# Patient Record
Sex: FEMALE | Employment: UNEMPLOYED | URBAN - METROPOLITAN AREA
[De-identification: names, ages, dates, MRNs, and addresses within clinical notes are randomized per-mention and may not be internally consistent; named-entity substitution may affect disease eponyms.]

---

## 2017-03-03 ENCOUNTER — TELEPHONE (OUTPATIENT)
Dept: INTERNAL MEDICINE CLINIC | Age: 37
End: 2017-03-03

## 2017-03-03 NOTE — TELEPHONE ENCOUNTER
Patient called she took her children to the Dr and they were diagnosed with the Flu she is now getting the same symptoms and wants to know if Dr Tressa Huang will send Tamiflu to her Pharmacy. She said she will come in if he wants but she will have to bring her children.  [phone]

## 2017-03-06 ENCOUNTER — TELEPHONE (OUTPATIENT)
Dept: INTERNAL MEDICINE CLINIC | Age: 37
End: 2017-03-06

## 2017-03-06 NOTE — TELEPHONE ENCOUNTER
Patient needs a referral to Dermatologist.  She would like to use [facility]. She has [employer].   Please call her at [phone]

## 2017-03-06 NOTE — TELEPHONE ENCOUNTER
Patient was notified of upcoming appointment with Dr. Kassy Garrido, with [facility], in the [place] office on 3/21 at 1:30.

## 2018-11-04 ENCOUNTER — HOSPITAL ENCOUNTER (INPATIENT)
Age: 38
LOS: 2 days | Discharge: HOME | End: 2018-11-06
Attending: OBSTETRICS & GYNECOLOGY | Admitting: SPECIALIST
Payer: COMMERCIAL

## 2018-11-04 PROBLEM — O22.03 OBSTETRIC VARICOSE VEINS IN THIRD TRIMESTER: Status: ACTIVE | Noted: 2018-11-04

## 2018-11-04 LAB
ABO + RH BLD: NORMAL
BASOPHILS # BLD: 0 K/UL (ref 0–0.1)
BASOPHILS NFR BLD: 0 % (ref 0–2)
BLOOD GROUP ANTIBODIES SERPL: NORMAL
DIFFERENTIAL METHOD BLD: ABNORMAL
EOSINOPHIL # BLD: 0.1 K/UL (ref 0–0.4)
EOSINOPHIL NFR BLD: 1 % (ref 0–5)
ERYTHROCYTE [DISTWIDTH] IN BLOOD BY AUTOMATED COUNT: 13.6 % (ref 11.6–14.5)
HCT VFR BLD AUTO: 37.5 % (ref 35–45)
HGB BLD-MCNC: 13 G/DL (ref 12–16)
LYMPHOCYTES # BLD: 1.7 K/UL (ref 0.9–3.6)
LYMPHOCYTES NFR BLD: 17 % (ref 21–52)
MCH RBC QN AUTO: 31.5 PG (ref 24–34)
MCHC RBC AUTO-ENTMCNC: 34.7 G/DL (ref 31–37)
MCV RBC AUTO: 90.8 FL (ref 74–97)
MONOCYTES # BLD: 0.8 K/UL (ref 0.05–1.2)
MONOCYTES NFR BLD: 8 % (ref 3–10)
NEUTS SEG # BLD: 7.3 K/UL (ref 1.8–8)
NEUTS SEG NFR BLD: 74 % (ref 40–73)
PLATELET # BLD AUTO: 186 K/UL (ref 135–420)
PMV BLD AUTO: 10 FL (ref 9.2–11.8)
RBC # BLD AUTO: 4.13 M/UL (ref 4.2–5.3)
SPECIMEN EXP DATE BLD: NORMAL
WBC # BLD AUTO: 9.8 K/UL (ref 4.6–13.2)

## 2018-11-04 PROCEDURE — 86900 BLOOD TYPING SEROLOGIC ABO: CPT | Performed by: ADVANCED PRACTICE MIDWIFE

## 2018-11-04 PROCEDURE — 65270000029 HC RM PRIVATE

## 2018-11-04 PROCEDURE — 74011250636 HC RX REV CODE- 250/636: Performed by: ADVANCED PRACTICE MIDWIFE

## 2018-11-04 PROCEDURE — 74011250637 HC RX REV CODE- 250/637: Performed by: ADVANCED PRACTICE MIDWIFE

## 2018-11-04 PROCEDURE — 85025 COMPLETE CBC W/AUTO DIFF WBC: CPT | Performed by: ADVANCED PRACTICE MIDWIFE

## 2018-11-04 PROCEDURE — 0HQ9XZZ REPAIR PERINEUM SKIN, EXTERNAL APPROACH: ICD-10-PCS | Performed by: OBSTETRICS & GYNECOLOGY

## 2018-11-04 RX ORDER — TERBUTALINE SULFATE 1 MG/ML
0.25 INJECTION SUBCUTANEOUS
Status: DISCONTINUED | OUTPATIENT
Start: 2018-11-04 | End: 2018-11-04

## 2018-11-04 RX ORDER — AMOXICILLIN 250 MG
1 CAPSULE ORAL
Status: DISCONTINUED | OUTPATIENT
Start: 2018-11-04 | End: 2018-11-06 | Stop reason: HOSPADM

## 2018-11-04 RX ORDER — HYDROMORPHONE HYDROCHLORIDE 1 MG/ML
1 INJECTION, SOLUTION INTRAMUSCULAR; INTRAVENOUS; SUBCUTANEOUS
Status: DISCONTINUED | OUTPATIENT
Start: 2018-11-04 | End: 2018-11-04

## 2018-11-04 RX ORDER — BUTORPHANOL TARTRATE 1 MG/ML
2 INJECTION INTRAMUSCULAR; INTRAVENOUS
Status: DISCONTINUED | OUTPATIENT
Start: 2018-11-04 | End: 2018-11-04

## 2018-11-04 RX ORDER — HYDROCORTISONE 25 MG/G
CREAM TOPICAL AS NEEDED
Status: DISCONTINUED | OUTPATIENT
Start: 2018-11-04 | End: 2018-11-06 | Stop reason: HOSPADM

## 2018-11-04 RX ORDER — IBUPROFEN 400 MG/1
800 TABLET ORAL
Status: DISCONTINUED | OUTPATIENT
Start: 2018-11-04 | End: 2018-11-06 | Stop reason: HOSPADM

## 2018-11-04 RX ORDER — LIDOCAINE HYDROCHLORIDE 10 MG/ML
20 INJECTION, SOLUTION EPIDURAL; INFILTRATION; INTRACAUDAL; PERINEURAL AS NEEDED
Status: DISCONTINUED | OUTPATIENT
Start: 2018-11-04 | End: 2018-11-04

## 2018-11-04 RX ORDER — OXYTOCIN/RINGER'S LACTATE 20/1000 ML
500 PLASTIC BAG, INJECTION (ML) INTRAVENOUS ONCE
Status: COMPLETED | OUTPATIENT
Start: 2018-11-04 | End: 2018-11-04

## 2018-11-04 RX ORDER — ACETAMINOPHEN 325 MG/1
650 TABLET ORAL
Status: DISCONTINUED | OUTPATIENT
Start: 2018-11-04 | End: 2018-11-06 | Stop reason: HOSPADM

## 2018-11-04 RX ORDER — OXYTOCIN/RINGER'S LACTATE 20/1000 ML
125 PLASTIC BAG, INJECTION (ML) INTRAVENOUS CONTINUOUS
Status: DISCONTINUED | OUTPATIENT
Start: 2018-11-04 | End: 2018-11-04

## 2018-11-04 RX ORDER — NALBUPHINE HYDROCHLORIDE 10 MG/ML
10 INJECTION, SOLUTION INTRAMUSCULAR; INTRAVENOUS; SUBCUTANEOUS
Status: DISCONTINUED | OUTPATIENT
Start: 2018-11-04 | End: 2018-11-04

## 2018-11-04 RX ORDER — METHYLERGONOVINE MALEATE 0.2 MG/ML
0.2 INJECTION INTRAVENOUS AS NEEDED
Status: DISCONTINUED | OUTPATIENT
Start: 2018-11-04 | End: 2018-11-04

## 2018-11-04 RX ORDER — SALICYLIC ACID
90 POWDER (GRAM) MISCELLANEOUS ONCE
Status: COMPLETED | OUTPATIENT
Start: 2018-11-04 | End: 2018-11-04

## 2018-11-04 RX ORDER — PROMETHAZINE HYDROCHLORIDE 25 MG/ML
25 INJECTION, SOLUTION INTRAMUSCULAR; INTRAVENOUS
Status: DISCONTINUED | OUTPATIENT
Start: 2018-11-04 | End: 2018-11-06 | Stop reason: HOSPADM

## 2018-11-04 RX ORDER — ZOLPIDEM TARTRATE 5 MG/1
5 TABLET ORAL
Status: DISCONTINUED | OUTPATIENT
Start: 2018-11-04 | End: 2018-11-06 | Stop reason: HOSPADM

## 2018-11-04 RX ORDER — MISOPROSTOL 200 UG/1
800 TABLET ORAL
Status: DISCONTINUED | OUTPATIENT
Start: 2018-11-04 | End: 2018-11-04

## 2018-11-04 RX ORDER — SODIUM CHLORIDE, SODIUM LACTATE, POTASSIUM CHLORIDE, CALCIUM CHLORIDE 600; 310; 30; 20 MG/100ML; MG/100ML; MG/100ML; MG/100ML
125 INJECTION, SOLUTION INTRAVENOUS CONTINUOUS
Status: DISCONTINUED | OUTPATIENT
Start: 2018-11-04 | End: 2018-11-04

## 2018-11-04 RX ADMIN — CASTOR OIL 60 ML: 1 LIQUID ORAL at 22:20

## 2018-11-04 RX ADMIN — Medication 10000 MILLI-UNITS/HR: at 22:28

## 2018-11-05 LAB
HCT VFR BLD AUTO: 35 % (ref 35–45)
HGB BLD-MCNC: 11.6 G/DL (ref 12–16)

## 2018-11-05 PROCEDURE — 74011250637 HC RX REV CODE- 250/637: Performed by: ADVANCED PRACTICE MIDWIFE

## 2018-11-05 PROCEDURE — 85014 HEMATOCRIT: CPT | Performed by: ADVANCED PRACTICE MIDWIFE

## 2018-11-05 PROCEDURE — 75410000003 HC RECOV DEL/VAG/CSECN EA 0.5 HR

## 2018-11-05 PROCEDURE — 65270000029 HC RM PRIVATE

## 2018-11-05 PROCEDURE — 36415 COLL VENOUS BLD VENIPUNCTURE: CPT | Performed by: ADVANCED PRACTICE MIDWIFE

## 2018-11-05 PROCEDURE — 85018 HEMOGLOBIN: CPT | Performed by: ADVANCED PRACTICE MIDWIFE

## 2018-11-05 PROCEDURE — 75410000000 HC DELIVERY VAGINAL/SINGLE

## 2018-11-05 PROCEDURE — 75410000002 HC LABOR FEE PER 1 HR

## 2018-11-05 RX ADMIN — IBUPROFEN 800 MG: 400 TABLET ORAL at 00:13

## 2018-11-05 RX ADMIN — IBUPROFEN 800 MG: 400 TABLET ORAL at 08:08

## 2018-11-05 RX ADMIN — IBUPROFEN 800 MG: 400 TABLET ORAL at 15:58

## 2018-11-05 NOTE — LACTATION NOTE
Mother breast fed four other babies. Mom states this baby has nursed well several times and baby is not yet 15 hours old. Experienced mother. No questions/concerns. Gave BF information, daily log and resource guide. Offered assistance if needed.

## 2018-11-05 NOTE — LACTATION NOTE
Mother asked for assistance. Her nipples are sore and she is unsure of the latch. Helped position cross cradle on right and baby latched well and nursed well. Reviewed latch, positioning and nipple care. Offered assistance if needed.

## 2018-11-05 NOTE — PROGRESS NOTES
Verbal shift change report given to ARSH Quesada RN (oncoming nurse) by Sophie Goldberg RN (offgoing nurse). Report included the following information SBAR, Kardex, Intake/Output and MAR.  
9986 Patient called out requesting motrin. 3562 Patient sitting up in bed eating breakfast, pain 5/10, motrin administered. 2738 Patient armbands verified, and trimmed 
0910 Patient request ice packs, delivered three terry ice packs. 1503 Patient advised to call nurse when baby has feed and is ready to go to nursery for hearing screen. 1115 Patient request baby to have hearing screen while taking a shower 1230 Patient IV removed, tolerated will. Patient awake, sitting on side of bed eating lunch, no needs at this time. 1 Patient's family present taking photos, witchhazel and dermaplast given to patient, placed in bathroom. [address] filled for patient, no other needs at this time.

## 2018-11-05 NOTE — ROUTINE PROCESS
TRANSFER - IN REPORT: 
 
Verbal report received from [facility] - URMILA NUÑEZ(name) on Mannie Espinosa  being received from labor and delivery(unit) for routine progression of care Report consisted of patients Situation, Background, Assessment and  
Recommendations(SBAR). Information from the following report(s) SBAR, Kardex, Procedure Summary, Intake/Output, MAR and Recent Results was reviewed with the receiving nurse. Opportunity for questions and clarification was provided. Care assumed.

## 2018-11-05 NOTE — L&D DELIVERY NOTE
Delivery Summary      Fei Uribe progressed well in active labor, felt pressure at 9 cms, pushed well for 2 contractions to birth. First  push brought SROM with thin meconium. Peds in room.  of VMI over 1st degree laceration. Baby with spont cry and pink color. Placed on mom's lower abdomen as cord was short. Cord double clamped andthen cut by dad after pulsation complete. Baby moved skin to skin on mom's chest. Placenta  Delivered spont intact with 3 CV.  cc. 1st degree repaired with chromic X 1 figure of 8 to hemostasis.  cc. Dr Danika Kim in house. [facility] [place]      Patient: Kyra Pandya MRN: [id number]  SSN: [id number]    YOB: 1980  Age: 45 y.o. Sex: female       Information for the patient's :  Aggie Vega [[id number]]       Labor Events:    Labor: No   Rupture Date: 2018   Rupture Time: 10:20 PM   Rupture Type     Amniotic Fluid Volume: Moderate    Amniotic Fluid Description: Meconium None   Induction: None       Augmentation: None   Labor Events: None     Cervical Ripening:     None     Delivery Events:  Episiotomy:     Laceration(s): First degree perineal     Repaired: Yes    Number of Repair Packets: 1   Suture Type and Size: Chromic 3-0     Estimated Blood Loss (ml): 250ml       Delivery Date: 2018    Delivery Time: 10:23 PM  Delivery Type: Vaginal, Spontaneous  Sex:  Male     Gestational Age: 40w1d   Delivery Clinician:  Brando Carcamo  Living Status: Living   Delivery Location: L&D            APGARS  One minute Five minutes Ten minutes   Skin color: 0   1        Heart rate: 2   2        Grimace: 2   2        Muscle tone: 2   2        Breathin   2        Totals: 8   9            Presentation: Vertex    Position:   Occiput Anterior  Resuscitation Method:  Suctioning-bulb     Meconium Stained: Thin      Cord Information: 3 Vessels  Complications: None  Cord around:    Delayed cord clamping?  Yes  Cord clamped date/time:2018 10:25 PM  Disposition of Cord Blood: Lab    Blood Gases Sent?: No    Placenta:  Date/Time: 2018 10:29 PM  Removal: Spontaneous      Appearance: Normal     [place] Measurements:  Birth Weight:        Birth Length:        Head Circumference:        Chest Circumference:       Abdominal Girth:       Other Providers:   ;Ania RUBY KRISTOL K;;;;;;Melissa KELLY;;Joyce RAI, Obstetrician;Primary Nurse;Primary [place] Nurse;Nicu Nurse;Neonatologist;Anesthesiologist;Crna;Nurse Practitioner;Midwife;Nursery Nurse;Pediatrician;Staff Nurse;Staff Nurse           Group B Strep:   Lab Results   Component Value Date/Time    GrBStrep, External NEG 2016     Information for the patient's :  Lyle Medellin [[id number]]   No results found for: ABORH, PCTABR, PCTDIG, BILI, ABORHEXT, ABORH    No results found for: APH, APCO2, APO2, AHCO3, ABEC, ABDC, O2ST, EPHV, PCO2V, PO2V, HCO3V, EBEV, EBDV, SITE, RSCOM

## 2018-11-05 NOTE — ROUTINE PROCESS
-Patient 41.1 weeks  arrived to Labor and delivery with complaints of contractions. 2100- MANJU Simms at bedside SVE 80/-1 
- Patient calls out to nurses' station states feeling increased pressure SVE 9./+1 intact. MANJU Simms notified. - MANJU Simms at bedside. SROM 2220 meconium. 2223- viable male 2330- Pt ambulates to restroom with 2 person assist. 0145- Patient moved to room 3409 via wheelchair. 0209- Report given to AFTAB Rajan

## 2018-11-05 NOTE — PROGRESS NOTES
Post-Partum Day Number 1 Progress Note Nuzhat De is doing well and would like another day. Her  is at her side helping her settle onto the bed. Vitals:   
Patient Vitals for the past 8 hrs: 
 BP Temp Pulse Resp SpO2  
18 0810 106/66 97.8 °F (36.6 °C) 91 16 99 % 18 0400 108/83 98.6 °F (37 °C) 93 17 98 % 18 0101 121/55  85   Temp (24hrs), Av.9 °F (36.6 °C), Min:97.5 °F (36.4 °C), Max:98.6 °F (37 °C) Vital signs stable, afebrile. Exam:  Patient without distress. Breasts intact and nontender Abdomen soft, fundus firm at level of umbilicus, nontender Perineum with normal lochia noted. Lower extremities are negative for swelling, cords or tenderness. Lab/Data Review: All lab results for the last 24 hours reviewed. Assessment and Plan:  Patient appears to be having uncomplicated post-partum course. Continue routine perineal care and maternal education. Plan discharge tomorrow if no problems occur.  
 
Laney Millan CNM 
2018 
8:48 AM

## 2018-11-05 NOTE — H&P
Obstetric Admission History and Physical 
 
Name: Guillermo Chanel MRN: [id number] SSN: [id number] YOB: 1980  Age: 45 y.o. Sex: female Subjective: Chief complaint:   
Chief Complaint Patient presents with  Contractions Kevin Fitzpatrick is a 45 y.o.  female, G 6 P 4 who presents at 41.1 weeks gestation with contractions since 1800. On admission EFM strip is obtained and is Category 1. Looks uncomfortable with q 8-10 mins. OB HISTORY Prenatal care started at 8 wks with  [address]. TVS supporting dates and viability. Problems of pregnancy include  AMA- declined all testing; varicose veins- using compression hose and maternity belt with mod recovery; hx of 3 VBACs. . Total wt gain 25 lbs. GBS neg. 41 wk sono withEFW 45%/ Prabhjot of 4.6 BPP 10/10 PAST PREGNANCY HISTORY 
 M 8 lbs 3 oz PCS 
 M 42 wks 8 lbs 3 oz  -   27 hrs  M 41.1 8 lbs 6 oz   3 hrs  
2016  F 41.4  3 hrs  
 
PAST MEDICAL / SURGICAL HISTORY No past medical history on file. Problem List as of 2018 Date Reviewed: 2018 Codes Class Noted - Resolved Advanced maternal age during pregnancy ICD-10-CM: Pablo Ferrer ICD-9-CM: 659.60  2018 - Present Hx successful  (vaginal birth after ), currently pregnant ICD-10-CM: O34.219 ICD-9-CM: 654.20  12/15/2014 - Present * (Principal) Labor and delivery indication for care or intervention ICD-10-CM: O75.9 ICD-9-CM: 659.90  12/15/2014 - Present RESOLVED: Pregnant ICD-10-CM: Z34.90 ICD-9-CM: V22.2  2016 - 2018 RESOLVED: Postpartum care following vaginal delivery ICD-10-CM: Z39.2 ICD-9-CM: V24.2  12/15/2014 - 2018 RESOLVED: First degree perineal laceration during delivery ICD-10-CM: O70.0 ICD-9-CM: 664.01  12/15/2014 - 2018 FAMILY/ SOCIAL HISTORY Social History Socioeconomic History  Marital status:   
 Spouse name: Not on file  Number of children: Not on file  Years of education: Not on file  Highest education level: Not on file Social Needs  Financial resource strain: Not on file  Food insecurity - worry: Not on file  Food insecurity - inability: Not on file  Transportation needs - medical: Not on file  Transportation needs - non-medical: Not on file Occupational History  Not on file Tobacco Use  Smoking status: Never Smoker  Smokeless tobacco: Never Used Substance and Sexual Activity  Alcohol use: No  
 Drug use: No  
 Sexual activity: Yes  
  Partners: Male Other Topics Concern [address] Service Not Asked  Blood Transfusions Not Asked  Caffeine Concern Not Asked  Occupational Exposure Not Asked Adah Mingle Hazards Not Asked  Sleep Concern Not Asked  Stress Concern Not Asked  Weight Concern Not Asked  Special Diet Not Asked  Back Care Not Asked  Exercise Not Asked  Bike Helmet Not Asked  Seat Belt Not Asked  Self-Exams Not Asked Social History Narrative  Not on file ALLERGY: 
Allergies Allergen Reactions  Adhesive Tape-Silicones Hives  Biaxin [Clarithromycin] Hives Review of Systems: A comprehensive review of systems was negative Objective: VITAL SIGNS: 
Visit Vitals /79 Pulse (!) 108 Temp 97.5 °F (36.4 °C) Resp 20 Ht 5' 6\" (1.676 m) Wt 79.4 kg (175 lb) SpO2 100% BMI 28.25 kg/m² Physical Exam: Abdomen: soft Position of baby vtx Estimated fetal weight 8 lbs 8 oz Contractions q 10 mins/mod quality FHR baseline at  130 bpm/ variability mod/Category 1/accels External Genitalia: normal general appearance without lesions Cervix: Dilation: 4-5 cm/ 80%/ -1 Membranes  intact Assessment:  
 
1) 41.1 weeks Intrauterine Pregnancy . 2) labor management 3) 3 successful VBACs Plan:  
 
Reassuring fetal status,  Rapid labors when becomes active. 3 successful VBACs; still discussed benefits/ risks. FREDERICK consent given and signed with all questioned answered. Dr Ludivina Kraft MD  notified and aware of admission Signed By:  Pablo Angulo CNM November 4, 2018

## 2018-11-06 VITALS
SYSTOLIC BLOOD PRESSURE: 108 MMHG | WEIGHT: 175 LBS | HEIGHT: 66 IN | HEART RATE: 83 BPM | DIASTOLIC BLOOD PRESSURE: 58 MMHG | RESPIRATION RATE: 17 BRPM | TEMPERATURE: 98.1 F | OXYGEN SATURATION: 98 % | BODY MASS INDEX: 28.12 KG/M2

## 2018-11-06 PROBLEM — O22.03 OBSTETRIC VARICOSE VEINS IN THIRD TRIMESTER: Status: RESOLVED | Noted: 2018-11-04 | Resolved: 2018-11-06

## 2018-11-06 PROCEDURE — 74011250637 HC RX REV CODE- 250/637: Performed by: ADVANCED PRACTICE MIDWIFE

## 2018-11-06 RX ADMIN — IBUPROFEN 800 MG: 400 TABLET ORAL at 00:15

## 2018-11-06 RX ADMIN — IBUPROFEN 800 MG: 400 TABLET ORAL at 09:08

## 2018-11-06 NOTE — DISCHARGE INSTRUCTIONS

## 2018-11-06 NOTE — PROGRESS NOTES
Progress Note Patient: Aldo Hester MRN: [id number] YOB: 1980  Age: 45 y.o. Subjective:  
 
Postpartum Day: 2 The patient is feeling tired. Pain is  well controlled with current medications. Urinary output is adequate. Baby is feeding via breast without difficulty. Objective:  
  
Patient Vitals for the past 12 hrs: 
 Temp Pulse Resp BP SpO2  
11/06/18 0210 97.7 °F (36.5 °C) 80 15 98/57 97 % 11/05/18 2000 97.9 °F (36.6 °C) 82 17 111/63 98 % General:    alert Lochia:  appropriate Uterine Fundus:   firm @ umbilicus Perineum:  well-approximated DVT Evaluation:  No evidence of DVT seen on physical exam.  
 
 
Assessment:  
 
Delivery: spontaneous vaginal delivery Plan:  
 
Doing well postpartum vaginal delivery. Plan DC home today. Follow-up in the office in 6 weeks. Call prn. Current Discharge Medication List  
  
CONTINUE these medications which have NOT CHANGED Details Omega-3 Fatty Acids (FISH OIL) 500 mg cap Take 2 Caps by mouth daily. cholecalciferol (VITAMIN D3) 5,000 unit capsule Take 5,000 Units by mouth daily. CALCIUM PO Take  by mouth. PRENATAL VIT/IRON FUMARATE/FA (PRENATAL PO) Take  by mouth. Signed By: Jania Weston CNM November 6, 2018

## 2018-11-06 NOTE — ROUTINE PROCESS
Verbal shift change report given to Danny Reynolds RN (oncoming nurse) by Rosenda Aguirre RN (offgoing nurse). Report included the following information Kardex, Intake/Output, MAR and Recent Results. 0850--assessment--voiding without difficulty--no weakness when up--has not had BM--high fiber/high fluid diet encouraged--effective pain management with Motrin--breast feeding is going well--POC for discharge discussed--verbalizes understanding. 9810--discharge instructions reviewed and signed 1255--discharged via wheelchair with baby in car seat carrier. Baby transported home in a car seat.

## 2018-11-06 NOTE — DISCHARGE SUMMARY
Obstetrical Discharge Summary     Name: Bhargavi Marshall MRN: [id number]  SSN: [id number]    YOB: 1980  Age: 45 y.o. Sex: female      Admit Date: 2018    Discharge Date: 2018     Admitting Physician: Cheyenne Soni MD     Attending Physician:  Jonatan Hernandez MD     * Admission Diagnoses: maternity  Labor and delivery indication for care or intervention    * Discharge Diagnoses:   Information for the patient's :  Nasrin Garay [[id number]]   Delivery of a 3.825 kg male infant via Vaginal, Spontaneous on 2018 at 10:23 PM  by . Apgars were 8 and 9. Additional Diagnoses:   Hospital Problems as of 2018 Date Reviewed: 2018          Codes Class Noted - Resolved POA    Postpartum care following vaginal delivery ICD-10-CM: Z39.2  ICD-9-CM: V24.2  12/15/2014 - Present No        RESOLVED: Obstetric varicose veins in third trimester ICD-10-CM: O22.03  ICD-9-CM: 671.03  2018 - 2018 Yes        * (Principal) RESOLVED: Postpartum care and examination of lactating mother ICD-10-CM: Z39.1  ICD-9-CM: V24.1  2018 - 2018 Yes        RESOLVED: Labor and delivery indication for care or intervention ICD-10-CM: O75.9  ICD-9-CM: 659.90  12/15/2014 - 2018 Yes             Lab Results   Component Value Date/Time    ABO/Rh(D) A POSITIVE 2018 09:08 PM    Rubella, External immune 2016    GrBStrep, External NEG 2016    ABO,Rh A positive 2014      Immunization History   Administered Date(s) Administered    DTaP 2016    Influenza Vaccine ([place] [place] Can[facility] Kid[facility]) PF 2014       * Procedures:        * Discharge Condition: stable    * Hospital Course: Normal hospital course following the delivery. * Disposition: Home    Discharge Medications:   Current Discharge Medication List      CONTINUE these medications which have NOT CHANGED    Details   Omega-3 Fatty Acids (FISH OIL) 500 mg cap Take 2 Caps by mouth daily. cholecalciferol (VITAMIN D3) 5,000 unit capsule Take 5,000 Units by mouth daily. CALCIUM PO Take  by mouth. PRENATAL VIT/IRON FUMARATE/FA (PRENATAL PO) Take  by mouth. * Follow-up Care/Patient Instructions:   Activity: Activity as tolerated, No sex for 6 weeks and No heavy lifting for 6 weeks  Diet: Regular Diet    Follow-up Information     Follow up With Specialties Details Why [place], [facility]  In 6 weeks  [facility]  [phone]           Signed By:  Rosita Aguero CNM     November 6, 2018

## 2018-11-06 NOTE — PROGRESS NOTES
1905 - Verbal shift change report given to URMILA Lynn (oncoming nurse) by Tawnya Valdez RN (offgoing nurse). Report included the following information SBAR, Kardex, Intake/Output, MAR and Recent Results. 2000 - Discussed plan for shift, hourly rounding, pain reporting/management, normal vs abnormal findings, tests/procedures ordered, when to call nurse and pt safety. Questions answered. Assessment completed. VSS.  
 
2115 - Infant brought to nursery. 2145 - Infant returned to room. 11/6/18 
 
0110 - Pt found crying in bed due to lack of sleep and infant wanting to cluster feed. Infant taken to nursery so mother can sleep. 0210 - Infant returned to room due to feeding cues. Assessment. VSS. Questions answered.

## 2024-10-12 ENCOUNTER — OFFICE VISIT (OUTPATIENT)
Dept: URGENT CARE | Age: 44
End: 2024-10-12

## 2024-10-12 VITALS
DIASTOLIC BLOOD PRESSURE: 82 MMHG | SYSTOLIC BLOOD PRESSURE: 132 MMHG | BODY MASS INDEX: 34.16 KG/M2 | OXYGEN SATURATION: 98 % | WEIGHT: 205 LBS | HEART RATE: 92 BPM | TEMPERATURE: 98 F | HEIGHT: 65 IN

## 2024-10-12 DIAGNOSIS — R35.0 URINARY FREQUENCY: ICD-10-CM

## 2024-10-12 DIAGNOSIS — N30.01 ACUTE CYSTITIS WITH HEMATURIA: Primary | ICD-10-CM

## 2024-10-12 PROBLEM — O34.219 VAGINAL BIRTH AFTER CESAREAN: Status: RESOLVED | Noted: 2024-10-12 | Resolved: 2024-10-12

## 2024-10-12 PROBLEM — L30.1 DYSHIDROTIC ECZEMA: Status: ACTIVE | Noted: 2024-02-20

## 2024-10-12 PROBLEM — O48.0 POST-TERM PREGNANCY: Status: RESOLVED | Noted: 2024-10-12 | Resolved: 2024-10-12

## 2024-10-12 PROBLEM — O34.219 VBAC, DELIVERED, CURRENT HOSPITALIZATION: Status: RESOLVED | Noted: 2021-12-14 | Resolved: 2024-10-12

## 2024-10-12 PROBLEM — Z34.90 PREGNANCY: Status: RESOLVED | Noted: 2018-03-30 | Resolved: 2024-10-12

## 2024-10-12 PROBLEM — O26.849 UTERINE SIZE-DATE DISCREPANCY: Status: RESOLVED | Noted: 2024-10-12 | Resolved: 2024-10-12

## 2024-10-12 PROBLEM — N39.3 FEMALE STRESS INCONTINENCE: Status: RESOLVED | Noted: 2024-10-12 | Resolved: 2024-10-12

## 2024-10-12 LAB
BILIRUBIN, POC: ABNORMAL
BLOOD URINE, POC: ABNORMAL
CLARITY, POC: CLEAR
COLOR, POC: ABNORMAL
GLUCOSE URINE, POC: ABNORMAL MG/DL
KETONES, POC: ABNORMAL MG/DL
LEUKOCYTE EST, POC: ABNORMAL
NITRITE, POC: ABNORMAL
PH, POC: 6
PROTEIN, POC: ABNORMAL MG/DL
SPECIFIC GRAVITY, POC: 1
UROBILINOGEN, POC: ABNORMAL

## 2024-10-12 RX ORDER — PHENAZOPYRIDINE HYDROCHLORIDE 200 MG/1
200 TABLET, FILM COATED ORAL 3 TIMES DAILY PRN
Qty: 9 TABLET | Refills: 0 | Status: SHIPPED | OUTPATIENT
Start: 2024-10-12 | End: 2024-10-15

## 2024-10-12 RX ORDER — CEPHALEXIN 500 MG/1
500 CAPSULE ORAL 2 TIMES DAILY
Qty: 14 CAPSULE | Refills: 0 | Status: SHIPPED | OUTPATIENT
Start: 2024-10-12 | End: 2024-10-19

## 2024-10-12 ASSESSMENT — VISUAL ACUITY: OU: 1

## 2024-10-12 NOTE — PROGRESS NOTES
Mandy Yoon (: 1980) is a 44 y.o. female, New patient, here for evaluation of the following chief complaint(s):  Urinary Frequency (Urinary frequency, burning. Sx started this morning)      ASSESSMENT/PLAN:    ICD-10-CM    1. Acute cystitis with hematuria  N30.01 cephALEXin (KEFLEX) 500 MG capsule     Culture, Urine     phenazopyridine (PYRIDIUM) 200 MG tablet      2. Urinary frequency  R35.0 POCT Urinalysis no Micro          - Urinary Tract Infection:  UA showing blood, and with symptoms of dysuria, urinary frequency, urinary urgency, and hematuria, there is concern for UTI  Exam is otherwise reassuring, without concerns for complicated UTI (no CVA tenderness, fevers, nausea, vomiting, or abdominal pain).  Low concern for sepsis, pyelonephritis, nephrolith, colitis, diverticulitis, appendicitis, and vulvo-vaginitis.  Urine culture sent to confirm, to identify pathogen, and to clarify sensitivities.   Will augment treatment plan as necessary pending culture results.  Keflex is prescribed for empiric antibiotic treatment  Pyridium prescribed for relief of dysuria.  Increase water intake during treatment.  ibuprofen (Motrin or Advil) or Acetaminophen (Tylenol) for pain symptoms.  Strict ED follow up instructions provided.      Discussed PCP follow up for persisting or worsening symptoms, or to return to the clinic if unable to obtain PCP follow up for worsening symptoms.    The patient tolerated their visit well. The patient and/or the family were informed of the results of any tests, a time was given to answer questions, a plan was proposed and they agreed with plan. Reviewed AVS with treatment instructions and answered questions - pt/family expresses understanding and agreement with the discussed treatment plan and AVS instructions.      SUBJECTIVE/OBJECTIVE:  HPI:   44 y.o. female presents for complaint of possible UTI x this morning.    Admits burning with urination (especially at the end of

## 2024-10-12 NOTE — PATIENT INSTRUCTIONS
Urine culture sent to confirm, to identify pathogen, and to clarify sensitivities.   Will augment treatment plan as necessary pending culture results.  Keflex is prescribed for antibiotic treatment  Take the antibiotics until completed, do not stop unless otherwise directed by a healthcare provider.  Recommend daily yogurt or other probiotics while on antibiotics.  Pyridium prescribed for relief of dysuria.  Increase water intake during treatment.  Can take ibuprofen (Motrin or Advil) or Acetaminophen (Tylenol) for pain symptoms.  Follow up with your PCP within 5 days or, if unable, you can return to the clinic if symptoms persist beyond 5 days or if symptoms worsen.    If fevers, shivering, nausea, vomiting, shortness of breath, chest pain, if severe abdominal or back pain develops, or if symptoms continue to worsen despite treatment plan, follow up with the ER for further evaluation.    New Prescriptions    CEPHALEXIN (KEFLEX) 500 MG CAPSULE    Take 1 capsule by mouth 2 times daily for 7 days    PHENAZOPYRIDINE (PYRIDIUM) 200 MG TABLET    Take 1 tablet by mouth 3 times daily as needed for Pain

## 2024-10-15 LAB
BACTERIA UR CULT: ABNORMAL
ORGANISM: ABNORMAL

## 2024-12-20 ENCOUNTER — OFFICE VISIT (OUTPATIENT)
Dept: URGENT CARE | Age: 44
End: 2024-12-20

## 2024-12-20 VITALS
SYSTOLIC BLOOD PRESSURE: 127 MMHG | OXYGEN SATURATION: 98 % | HEIGHT: 65 IN | DIASTOLIC BLOOD PRESSURE: 80 MMHG | HEART RATE: 88 BPM | BODY MASS INDEX: 31.65 KG/M2 | WEIGHT: 190 LBS

## 2024-12-20 DIAGNOSIS — N30.00 ACUTE CYSTITIS WITHOUT HEMATURIA: ICD-10-CM

## 2024-12-20 DIAGNOSIS — R30.0 BURNING WITH URINATION: Primary | ICD-10-CM

## 2024-12-20 LAB
APPEARANCE FLUID: CLEAR
BILIRUBIN, POC: NEGATIVE
BLOOD URINE, POC: ABNORMAL
CLARITY, POC: ABNORMAL
COLOR, POC: YELLOW
GLUCOSE URINE, POC: NEGATIVE MG/DL
KETONES, POC: NEGATIVE MG/DL
LEUKOCYTE EST, POC: ABNORMAL
NITRITE, POC: ABNORMAL
PH, POC: 6
PROTEIN, POC: NEGATIVE MG/DL
SPECIFIC GRAVITY, POC: <=1.005
UROBILINOGEN, POC: NEGATIVE MG/DL

## 2024-12-20 RX ORDER — CEPHALEXIN 500 MG/1
500 CAPSULE ORAL 2 TIMES DAILY
Qty: 14 CAPSULE | Refills: 0 | Status: SHIPPED | OUTPATIENT
Start: 2024-12-20 | End: 2024-12-27

## 2024-12-20 ASSESSMENT — ENCOUNTER SYMPTOMS
ABDOMINAL PAIN: 0
EYE PAIN: 0
VOMITING: 0
DIARRHEA: 0
SHORTNESS OF BREATH: 0
NAUSEA: 0

## 2024-12-20 NOTE — PROGRESS NOTES
Mandy oYon (: 1980) is a 44 y.o. female, Established patient, here for evaluation of the following chief complaint(s):  Urinary Tract Infection (Burning with urination. 3 hours. )      ASSESSMENT/PLAN:    ICD-10-CM    1. Burning with urination  R30.0 POCT Urinalysis no Micro      2. Acute cystitis without hematuria  N30.00 cephALEXin (KEFLEX) 500 MG capsule     Culture, Urine          - Will treat for UTI due to pts symptoms and urinalysis results. Low concern for pyelonephritis, PID, acute abdomen, lethargy or sepsis.  - Pt to drink lots of fluids  - Pt to take medication as prescribed  - Pt ok to take tylenol and ibuprofen as needed  - Pt to call if any symptoms worsen or follow up with PCP if not better in 7 days  - Pt to go to ER if have shortness of breath, chest pain, sudden fever, abdominal pain, pelvic pain, flank pain or lethargy  - Urine culture will be back in 2-3 days    Discussed PCP follow up for persisting or worsening symptoms, or to return to the clinic if unable to obtain PCP follow up for worsening symptoms.    The patient tolerated their visit well. The patient and/or the family were informed of the results of any tests, a time was given to answer questions, a plan was proposed and they agreed with plan. Reviewed AVS with treatment instructions and answered questions - pt/family expresses understanding and agreement with the discussed treatment plan and AVS instructions.      SUBJECTIVE/OBJECTIVE:  HPI:   44 y.o. female presents for complaint of pt states she started today with dysuria. Pt states she also has urinary frequency and urinary urgency.     Denies fever, body aches, abdominal pain, flank pain, vomiting, diarrhea, hematuria, vaginal pain, pelvic pain, abnormal vaginal discharge, vaginal irritation, vaginal itching or rash.    Has taken azo today. Pt states she has had UTIs in the past and this one feels similar to her past ones.     Pt provided HPI by themself - pt

## 2024-12-22 LAB
BACTERIA UR CULT: ABNORMAL
ORGANISM: ABNORMAL

## 2024-12-23 LAB
BACTERIA UR CULT: ABNORMAL
ORGANISM: ABNORMAL

## 2025-06-17 ENCOUNTER — OFFICE VISIT (OUTPATIENT)
Dept: URGENT CARE | Age: 45
End: 2025-06-17

## 2025-06-17 VITALS
DIASTOLIC BLOOD PRESSURE: 68 MMHG | HEART RATE: 75 BPM | SYSTOLIC BLOOD PRESSURE: 103 MMHG | WEIGHT: 178 LBS | TEMPERATURE: 97 F | BODY MASS INDEX: 29.62 KG/M2 | OXYGEN SATURATION: 97 %

## 2025-06-17 DIAGNOSIS — M79.672 LEFT FOOT PAIN: ICD-10-CM

## 2025-06-17 DIAGNOSIS — L02.612 ABSCESS OF LEFT FOOT: Primary | ICD-10-CM

## 2025-06-17 PROBLEM — J02.9 SORE THROAT: Status: RESOLVED | Noted: 2025-06-17 | Resolved: 2025-06-17

## 2025-06-17 PROBLEM — E55.9 VITAMIN D DEFICIENCY: Status: ACTIVE | Noted: 2025-06-17

## 2025-06-17 PROBLEM — R42 DIZZINESS AND GIDDINESS: Status: RESOLVED | Noted: 2019-09-10 | Resolved: 2025-06-17

## 2025-06-17 PROBLEM — R31.9 HEMATURIA: Status: RESOLVED | Noted: 2025-06-17 | Resolved: 2025-06-17

## 2025-06-17 PROBLEM — L30.9 DERMATITIS: Status: ACTIVE | Noted: 2017-07-19

## 2025-06-17 PROBLEM — B35.1 TINEA UNGUIUM: Status: ACTIVE | Noted: 2017-07-19

## 2025-06-17 PROBLEM — S39.92XA LOWER BACK INJURY: Status: RESOLVED | Noted: 2017-07-19 | Resolved: 2025-06-17

## 2025-06-17 PROBLEM — I87.2 PERIPHERAL VENOUS INSUFFICIENCY: Status: ACTIVE | Noted: 2017-07-19

## 2025-06-17 PROBLEM — G57.10 MERALGIA PARESTHETICA: Status: ACTIVE | Noted: 2025-06-17

## 2025-06-17 PROBLEM — E66.3 OVERWEIGHT: Status: ACTIVE | Noted: 2019-09-10

## 2025-06-17 PROBLEM — N92.6 MISSED MENSES: Status: RESOLVED | Noted: 2025-06-17 | Resolved: 2025-06-17

## 2025-06-17 PROBLEM — E66.811 CLASS 1 OBESITY: Status: ACTIVE | Noted: 2025-06-17

## 2025-06-17 PROBLEM — Z30.9 ENCOUNTER FOR CONTRACEPTIVE MANAGEMENT: Status: RESOLVED | Noted: 2025-06-17 | Resolved: 2025-06-17

## 2025-06-17 RX ORDER — DOXYCYCLINE HYCLATE 100 MG
100 TABLET ORAL 2 TIMES DAILY
Qty: 14 TABLET | Refills: 0 | Status: SHIPPED | OUTPATIENT
Start: 2025-06-17 | End: 2025-06-24

## 2025-06-17 ASSESSMENT — VISUAL ACUITY: OU: 1

## 2025-06-17 NOTE — PROGRESS NOTES
Mandy Yoon (: 1980) is a 45 y.o. female, Established patient, here for evaluation of the following chief complaint(s):  Foot Pain (Pain in heal on left foot  since last Wednesday. Now has a lump on the side of her left heal this morning. )      ASSESSMENT/PLAN:    ICD-10-CM    1. Abscess of left foot  L02.612 doxycycline hyclate (VIBRA-TABS) 100 MG tablet      2. Left foot pain  M79.672           - Abscess of the Left Foot:  Exam of the lateral heel of the left foot is concerning for a small, 4mm, firm induration with mild erythema and significant tenderness to palpation that is most consistent with an abscess. There is no evidence of a \"head\", puncture wounds, abrasions, skin cracking, or skin peeling as signs of a more obvious cause to the induration, and no spreading erythema beyond the borders of the induration, heat, or red streaks consistent with a spreading cellulitis.  Low concerns for contact dermatitis, retained foreign body, heel fracture, compartment syndrome, osteomyelitis, and sepsis.  Doxycycline is prescribed for antibiotic treatment of the skin infection, and to prevent further spread of the infection should the abscess drain internally, given the patient's recent and current increased demand for physical activity while performing her home renovation.  Discussed warm compresses/warm soaks over the site, several times per day, and to provide any necessary wound care.  Ibuprofen and/or acetaminophen for pain/swelling.  Return to the clinic for persistent symptoms.  Strict ED follow up instructions provided for any worsening in condition.    Discussed PCP follow up for persisting or worsening symptoms, or to return to the clinic if unable to obtain PCP follow up for worsening symptoms.    The patient tolerated their visit well. A time was given to answer questions and a plan was established, proposed, and was agreed upon. Reviewed AVS with treatment instructions and answered

## 2025-06-17 NOTE — PATIENT INSTRUCTIONS
Mandy,    Thank you for trusting [facility] with your care. Your decision to come to us means a lot and we are honored to be part of your healthcare journey. We value your trust and hope your experience with us was positive and met your expectations.    We're always looking for ways to improve, and your feedback is incredibly important to us. You will receive a text or email soon asking you how your visit went. for If you could take a moment to share your thoughts, it would mean the world to us. Your input helps us better serve you and others in the community.     Thank you again for choosing us. We're grateful for the opportunity to care for you and your loved ones. We hope to see you again - though we always wish you health and wellness!    Warm regards,    [facility]    Scott Flores PA-C, Krystal (Registration), and Ashley (Medical Assistant)      Doxycycline is prescribed for antibiotic treatment of the skin infection  Take the antibiotics until completed, do not stop unless otherwise directed by a healthcare provider.  Recommend daily yogurt or other probiotics while on antibiotics.  Use hot compresses/warm soaks several times per day over the site.  Recommendation is for 10-15 minutes, 3 times per day, if possible.  Ibuprofen and/or acetaminophen for pain/swelling.  If a head develops, if the site grows larger, or if the site becomes much more painful, avoid attempting to pop it yourself, but follow up with your primary care provider, or return to the clinic to have the abscess drained appropriately to avoid worsening the infection.    If fevers, body aches, nausea, vomiting, or shivering develop, or if swelling continues to expand, follow up with the ED for further evaluation.    New Prescriptions    DOXYCYCLINE HYCLATE (VIBRA-TABS) 100 MG TABLET    Take 1 tablet by mouth 2 times daily for 7 days

## 2025-06-23 ENCOUNTER — OFFICE VISIT (OUTPATIENT)
Dept: URGENT CARE | Age: 45
End: 2025-06-23

## 2025-06-23 VITALS
OXYGEN SATURATION: 98 % | SYSTOLIC BLOOD PRESSURE: 110 MMHG | DIASTOLIC BLOOD PRESSURE: 72 MMHG | HEART RATE: 70 BPM | BODY MASS INDEX: 29.62 KG/M2 | WEIGHT: 178 LBS | TEMPERATURE: 97 F

## 2025-06-23 DIAGNOSIS — Z75.8 DOES NOT HAVE PRIMARY CARE PROVIDER: ICD-10-CM

## 2025-06-23 DIAGNOSIS — L02.612 ABSCESS OF LEFT FOOT: Primary | ICD-10-CM

## 2025-06-23 RX ORDER — DOXYCYCLINE HYCLATE 100 MG
100 TABLET ORAL 2 TIMES DAILY
Qty: 14 TABLET | Refills: 0 | Status: SHIPPED | OUTPATIENT
Start: 2025-06-23 | End: 2025-06-30

## 2025-06-23 NOTE — PATIENT INSTRUCTIONS
Schedule appointment with podiatrist-referral provided  Referral to PCP if patient wants to establish care  Continue footcare-Tylenol/ibuprofen as needed for pain local warm compress  If appointment with podiatrist not yet scheduled, patient can return to our office if having any new symptoms or concerns  Refill of doxycycline prescribed-as advised, patient not to start antibiotic unless area of her foot becomes more painful, warm  swollen and red again or drainage noted

## 2025-06-23 NOTE — PROGRESS NOTES
Mandy Yoon (:  1980) is a 45 y.o. female,Established patient referred to podiatrist referred to podiatrist referred to podiatrist, here for evaluation of the following chief complaint(s):  Abscess (Left foot. Finishing the antibiotic today but hasn't gotten any better. )      ASSESSMENT/PLAN:    ICD-10-CM    1. Abscess of left foot  L02.612 doxycycline hyclate (VIBRA-TABS) 100 MG tablet     [facility] - Nerissa Albert DPM, Podiatry, [facility]      2. Does not have primary care provider  Z75.[facility]        Doubt gout as cause of patient's symptoms/presentation, possible osteoarthritis  Unaware of any initial insect bite, persistent local irritation    Referred to podiatrist  ..discussed obtaining an x-ray however patient prefers to wait  till seen by podiatrist will extend antibiotic, warm compress, topical antibiotic as discussed     Schedule appointment with podiatrist-referral provided  Referral to PCP if patient wants to establish care  Continue footcare-Tylenol/ibuprofen as needed for pain local warm compress  If appointment with podiatrist not yet scheduled, patient can return to our office if having any new symptoms or concerns  Refill of doxycycline prescribed-as advised, patient not to start antibiotic unless area of her foot becomes more painful, warm  swollen and red again or drainage noted    SUBJECTIVE/OBJECTIVE:  Patient presents with:  Abscess: Left foot. Finishing the antibiotic today but hasn't gotten any better.   Was seen at our office on  was diagnosed with an abscess of her left foot and started antibiotic  Patient felt that her foot was improving, ...area involved became less tender less erythematous but as she completed her antibiotic area has become red tender and swollen again, without drainage and no known injury/trauma between visits  When initially seen unaware of any injury unaware of any risk for splinter   Recent office note

## 2025-07-24 ENCOUNTER — OFFICE VISIT (OUTPATIENT)
Dept: FAMILY MEDICINE CLINIC | Age: 45
End: 2025-07-24
Payer: COMMERCIAL

## 2025-07-24 VITALS
HEART RATE: 86 BPM | OXYGEN SATURATION: 98 % | RESPIRATION RATE: 16 BRPM | HEIGHT: 65 IN | TEMPERATURE: 97.1 F | WEIGHT: 178 LBS | SYSTOLIC BLOOD PRESSURE: 120 MMHG | DIASTOLIC BLOOD PRESSURE: 70 MMHG | BODY MASS INDEX: 29.66 KG/M2

## 2025-07-24 DIAGNOSIS — N39.0 RECURRENT UTI: ICD-10-CM

## 2025-07-24 DIAGNOSIS — L30.9 ECZEMA, UNSPECIFIED TYPE: ICD-10-CM

## 2025-07-24 DIAGNOSIS — Z00.00 ROUTINE GENERAL MEDICAL EXAMINATION AT A HEALTH CARE FACILITY: ICD-10-CM

## 2025-07-24 DIAGNOSIS — Z12.31 ENCOUNTER FOR SCREENING MAMMOGRAM FOR MALIGNANT NEOPLASM OF BREAST: ICD-10-CM

## 2025-07-24 DIAGNOSIS — Z87.42 HISTORY OF HEAVY PERIODS: ICD-10-CM

## 2025-07-24 DIAGNOSIS — Z13.220 SCREENING FOR LIPID DISORDERS: ICD-10-CM

## 2025-07-24 DIAGNOSIS — Z13.1 ENCOUNTER FOR SCREENING FOR DIABETES MELLITUS: ICD-10-CM

## 2025-07-24 DIAGNOSIS — Z12.11 SCREENING FOR MALIGNANT NEOPLASM OF COLON: ICD-10-CM

## 2025-07-24 DIAGNOSIS — Z13.1 DIABETES MELLITUS SCREENING: ICD-10-CM

## 2025-07-24 DIAGNOSIS — Z80.3 FAMILY HISTORY OF BREAST CANCER IN MOTHER: ICD-10-CM

## 2025-07-24 DIAGNOSIS — Z00.00 ENCOUNTER FOR MEDICAL EXAMINATION TO ESTABLISH CARE: Primary | ICD-10-CM

## 2025-07-24 PROCEDURE — 99386 PREV VISIT NEW AGE 40-64: CPT | Performed by: STUDENT IN AN ORGANIZED HEALTH CARE EDUCATION/TRAINING PROGRAM

## 2025-07-24 SDOH — ECONOMIC STABILITY: FOOD INSECURITY: WITHIN THE PAST 12 MONTHS, THE FOOD YOU BOUGHT JUST DIDN'T LAST AND YOU DIDN'T HAVE MONEY TO GET MORE.: NEVER TRUE

## 2025-07-24 SDOH — ECONOMIC STABILITY: FOOD INSECURITY: WITHIN THE PAST 12 MONTHS, YOU WORRIED THAT YOUR FOOD WOULD RUN OUT BEFORE YOU GOT MONEY TO BUY MORE.: NEVER TRUE

## 2025-07-24 ASSESSMENT — PATIENT HEALTH QUESTIONNAIRE - PHQ9
SUM OF ALL RESPONSES TO PHQ QUESTIONS 1-9: 0
2. FEELING DOWN, DEPRESSED OR HOPELESS: NOT AT ALL
SUM OF ALL RESPONSES TO PHQ QUESTIONS 1-9: 0
SUM OF ALL RESPONSES TO PHQ QUESTIONS 1-9: 0
1. LITTLE INTEREST OR PLEASURE IN DOING THINGS: NOT AT ALL
SUM OF ALL RESPONSES TO PHQ QUESTIONS 1-9: 0

## 2025-07-24 NOTE — PATIENT INSTRUCTIONS
. Annual physical exam  -Chart/records reviewed, history and physical performed, health maintenance addressed and updated, presenting problems addressed.  -Recommend 150 minutes of cardiovascular activity a week, or 10,000 to 15,000 steps a day, 2 days of weightbearing  -Encourage healthy diet,avoid processed/refined carbohydrates   Health Maintenance Due   Topic Date Due    Depression Screen  Never done    Varicella vaccine (1 of 2 - 13+ 2-dose series) Never done    Hepatitis B vaccine (1 of 3 - 19+ 3-dose series) Never done    Cervical cancer screen  Never done    Diabetes screen  Never done    Lipids  Never done    COVID-19 Vaccine (5 - 2024-25 season) 09/01/2024    Breast cancer screen  03/15/2025    Colorectal Cancer Screen  Never done   FASTING LABS 8-12 hours    Schedule pap and mammogram   Cologuard- check insurance - for coverage (300)

## 2025-07-24 NOTE — PROGRESS NOTES
Mandy Yoon  YOB: 1980    Date of Service:  2025    Chief Complaint:   Mandy Yoon is a 45 y.o. female who presents for complete physical examination.  Concerns:   Chief Complaint   Patient presents with    New Patient       HPI:  Patient is a 45 y.o. female   (youngest 3) has a past medical history of Female stress incontinence,who presents to [facility] care   [place] [facility]   Mother has history of breast cancer , may have thyroid     With patient was recently seen in urgent care for abscess of left foot.  Was treated with doxycycline 2 courses since 2025. Seen by podiatry for bursitis.     Yeast infection after doxycycline.  Used suppository. Was having a lot of spotting.     In the last year 3 UTI. Concern for hormonal shifts. Taking women probiotic and Cranberry Supplement. Sometimes issus with leaking, bladder wall weakness. Concern for prolapse.     Date of last physical: over 1 year.   Recent illnesses/hospitalizations none     Wt Readings from Last 3 Encounters:   25 80.7 kg (178 lb)   25 80.7 kg (178 lb)   25 80.7 kg (178 lb)     BP Readings from Last 3 Encounters:   25 120/70   25 110/72   25 103/68     Lives at home with  6 children , no pets   Diet - Balanced, Trim healthy mama plan : limits sugars and white flour, high protein, produce , meats and sprouted bread   Exercise - Walking and hiking , home remodeling     Social history  Tobacco use/smoking history-Never   Alcohol use- Never   Illicit drug use history- Never   Mood - phq 2 of       2025     1:23 PM   PHQ-9    Little interest or pleasure in doing things 0   Feeling down, depressed, or hopeless 0   PHQ-2 Score 0   PHQ-9 Total Score 0   History of postpartum, never on medications.     Sexually active - Yes   Menses - Patient's last menstrual period was 2025 (exact date). Regular - cycle   History of contraception -